# Patient Record
Sex: FEMALE | Race: WHITE | NOT HISPANIC OR LATINO | Employment: PART TIME | URBAN - METROPOLITAN AREA
[De-identification: names, ages, dates, MRNs, and addresses within clinical notes are randomized per-mention and may not be internally consistent; named-entity substitution may affect disease eponyms.]

---

## 2023-01-01 ENCOUNTER — HOSPITAL ENCOUNTER (EMERGENCY)
Facility: HOSPITAL | Age: 18
Discharge: HOME | End: 2023-01-01
Attending: EMERGENCY MEDICINE
Payer: COMMERCIAL

## 2023-01-01 VITALS
OXYGEN SATURATION: 99 % | HEART RATE: 96 BPM | TEMPERATURE: 99 F | SYSTOLIC BLOOD PRESSURE: 132 MMHG | DIASTOLIC BLOOD PRESSURE: 75 MMHG | RESPIRATION RATE: 17 BRPM

## 2023-01-01 DIAGNOSIS — F19.90 SUBSTANCE USE: Primary | ICD-10-CM

## 2023-01-01 DIAGNOSIS — R55 NEAR SYNCOPE: ICD-10-CM

## 2023-01-01 PROCEDURE — 99283 EMERGENCY DEPT VISIT LOW MDM: CPT | Mod: ,,, | Performed by: EMERGENCY MEDICINE

## 2023-01-01 PROCEDURE — 99283 PR EMERGENCY DEPT VISIT,LEVEL III: ICD-10-PCS | Mod: ,,, | Performed by: EMERGENCY MEDICINE

## 2023-01-01 PROCEDURE — 99282 EMERGENCY DEPT VISIT SF MDM: CPT

## 2023-01-01 NOTE — ED TRIAGE NOTES
Pt brought by EMS. [place], taking Ketamine, Cocaine and shots. Pt does not remember anything until waking up in the ambulance. Pt has no complaints at this time. Pt reports taking Ketamine and cocaine prior to tonight with no incident.

## 2023-01-01 NOTE — ED NOTES
Hourly patient round completed. Patient is resting comfortably in bed in lowest, locked position with bed rails raised x2 in NAD. Call light is within reach of patient. Patient denies further needs at this time.

## 2023-01-01 NOTE — ED PROVIDER NOTES
Encounter Date: 1/1/2023       History     Chief Complaint   Patient presents with    Alcohol Intoxication     [place] alcohol in [place] and did a gram of ketamine. Per EMS aaox4.      Jerrica Sahu is a 17-year-old female no significant past medical history presenting today by EMS for acute drug intoxication.  Patient states she did cocaine and ketamine, [place]  She just finished walking a several blocks and the club was very crowded and hot.  Her friend said they went to the bathroom and when she checked on her, her eyes rolled backwards.  She did not fall or lose consciousness.  The  tried to administer Narcan then called EMS.      Review of patient's allergies indicates:  No Known Allergies  No past medical history on file.  No past surgical history on file.  No family history on file.     Review of Systems   Constitutional:  Negative for chills and fever.   Neurological:  Positive for light-headedness. Negative for syncope.   Psychiatric/Behavioral:  Negative for confusion.      Physical Exam     Initial Vitals [01/01/23 0342]   BP Pulse Resp Temp SpO2   (!) 136/90 110 14 98.8 °F (37.1 °C) 98 %      MAP       --         Physical Exam    Nursing note and vitals reviewed.  Constitutional: She appears well-developed and well-nourished. No distress.   HENT:   Mouth/Throat: Oropharynx is clear and moist.   Eyes: Conjunctivae and EOM are normal. Pupils are equal, round, and reactive to light. No scleral icterus.   Dilated pupils bilaterally   Neck: No JVD present.   Cardiovascular:  Normal rate, regular rhythm and intact distal pulses.           Pulmonary/Chest: Breath sounds normal. No respiratory distress. She has no wheezes. She has no rales.   Abdominal: Abdomen is soft. Bowel sounds are normal. She exhibits no distension. There is no abdominal tenderness. There is no rebound.   Musculoskeletal:         General: No edema.     Lymphadenopathy:     She has no cervical  adenopathy.   Neurological: She is alert and oriented to person, place, and time.   Skin: Skin is warm. No rash noted.       ED Course   Procedures  Labs Reviewed - No data to display  EKG Readings: (Independently Interpreted)   EKG: Sinus rhythm at 95, normal intervals, normal axis, right atrial enlargement, no ischemic changes     Imaging Results    None          Medications - No data to display  Medical Decision Making:   History:   Old Medical Records: I decided to obtain old medical records.  Initial Assessment:   Emergent evaluation of 17-year-old female presenting today for acute drug intoxication with near syncopal episode.  Vital signs reviewed, mildly tachycardic otherwise stable  Patient is well-appearing, no acute distress  Neurologically intact  Differential Diagnosis:   Arrhythmia, acute cocaine intoxication, ketamine intoxication, hypotension, dehydration  ED Management:  Patient observed for 2 hours without altered mental status, near-syncope.  She is ambulated in the emergency department without an unsteady gait.  She is answered all questions appropriately.  There is no signs of trauma.  No indication for imaging at this time.  EKG reviewed with no signs of arrhythmia.    Patient overall feels improved, requesting discharge.  She will call her friend for a ride.                        Clinical Impression:   Final diagnoses:  [F19.90] Substance use (Primary)  [R55] Near syncope        ED Disposition Condition    Discharge Stable          ED Prescriptions    None       Follow-up Information       Follow up With Specialties Details Why Contact Info    [facility] - Emergency Dept Emergency Medicine  If symptoms worsen [id number]  [facility] [id number]  [phone]             Isela Zarate MD  01/01/23 0549